# Patient Record
Sex: FEMALE | URBAN - METROPOLITAN AREA
[De-identification: names, ages, dates, MRNs, and addresses within clinical notes are randomized per-mention and may not be internally consistent; named-entity substitution may affect disease eponyms.]

---

## 2022-06-04 ENCOUNTER — TELEPHONE ENCOUNTER (OUTPATIENT)
Dept: URBAN - METROPOLITAN AREA CLINIC 68 | Facility: CLINIC | Age: 42
End: 2022-06-04

## 2022-06-05 ENCOUNTER — TELEPHONE ENCOUNTER (OUTPATIENT)
Dept: URBAN - METROPOLITAN AREA CLINIC 68 | Facility: CLINIC | Age: 42
End: 2022-06-05

## 2022-06-25 ENCOUNTER — TELEPHONE ENCOUNTER (OUTPATIENT)
Age: 42
End: 2022-06-25

## 2022-06-26 ENCOUNTER — TELEPHONE ENCOUNTER (OUTPATIENT)
Age: 42
End: 2022-06-26

## 2023-06-13 ENCOUNTER — OFFICE VISIT (OUTPATIENT)
Dept: PRIMARY CARE | Facility: CLINIC | Age: 43
End: 2023-06-13
Payer: COMMERCIAL

## 2023-06-13 ENCOUNTER — LAB (OUTPATIENT)
Dept: LAB | Facility: LAB | Age: 43
End: 2023-06-13
Payer: COMMERCIAL

## 2023-06-13 VITALS
DIASTOLIC BLOOD PRESSURE: 80 MMHG | RESPIRATION RATE: 18 BRPM | WEIGHT: 145 LBS | SYSTOLIC BLOOD PRESSURE: 122 MMHG | OXYGEN SATURATION: 98 % | HEIGHT: 62 IN | BODY MASS INDEX: 26.68 KG/M2 | HEART RATE: 72 BPM

## 2023-06-13 DIAGNOSIS — Z13.220 ENCOUNTER FOR LIPID SCREENING FOR CARDIOVASCULAR DISEASE: ICD-10-CM

## 2023-06-13 DIAGNOSIS — Z00.00 HEALTH MAINTENANCE EXAMINATION: ICD-10-CM

## 2023-06-13 DIAGNOSIS — C43.71 MALIGNANT MELANOMA OF RIGHT LOWER EXTREMITY INCLUDING HIP (MULTI): ICD-10-CM

## 2023-06-13 DIAGNOSIS — Z00.00 HEALTH MAINTENANCE EXAMINATION: Primary | ICD-10-CM

## 2023-06-13 DIAGNOSIS — Z13.6 ENCOUNTER FOR LIPID SCREENING FOR CARDIOVASCULAR DISEASE: ICD-10-CM

## 2023-06-13 DIAGNOSIS — K20.0 EOSINOPHILIC ESOPHAGITIS: ICD-10-CM

## 2023-06-13 PROBLEM — B00.1 RECURRENT COLD SORES: Status: ACTIVE | Noted: 2023-06-13

## 2023-06-13 PROBLEM — N63.20 LEFT BREAST MASS: Status: RESOLVED | Noted: 2023-06-13 | Resolved: 2023-06-13

## 2023-06-13 LAB
ALANINE AMINOTRANSFERASE (SGPT) (U/L) IN SER/PLAS: 11 U/L (ref 7–45)
ALBUMIN (G/DL) IN SER/PLAS: 4.8 G/DL (ref 3.4–5)
ALKALINE PHOSPHATASE (U/L) IN SER/PLAS: 68 U/L (ref 33–110)
ANION GAP IN SER/PLAS: 13 MMOL/L (ref 10–20)
ASPARTATE AMINOTRANSFERASE (SGOT) (U/L) IN SER/PLAS: 19 U/L (ref 9–39)
BASOPHILS (10*3/UL) IN BLOOD BY AUTOMATED COUNT: 0.04 X10E9/L (ref 0–0.1)
BASOPHILS/100 LEUKOCYTES IN BLOOD BY AUTOMATED COUNT: 1.2 % (ref 0–2)
BILIRUBIN TOTAL (MG/DL) IN SER/PLAS: 0.7 MG/DL (ref 0–1.2)
CALCIUM (MG/DL) IN SER/PLAS: 9.2 MG/DL (ref 8.6–10.3)
CARBON DIOXIDE, TOTAL (MMOL/L) IN SER/PLAS: 27 MMOL/L (ref 21–32)
CHLORIDE (MMOL/L) IN SER/PLAS: 103 MMOL/L (ref 98–107)
CHOLESTEROL (MG/DL) IN SER/PLAS: 174 MG/DL (ref 0–199)
CHOLESTEROL IN HDL (MG/DL) IN SER/PLAS: 69.4 MG/DL
CHOLESTEROL/HDL RATIO: 2.5
CREATININE (MG/DL) IN SER/PLAS: 0.89 MG/DL (ref 0.5–1.05)
EOSINOPHILS (10*3/UL) IN BLOOD BY AUTOMATED COUNT: 0.07 X10E9/L (ref 0–0.7)
EOSINOPHILS/100 LEUKOCYTES IN BLOOD BY AUTOMATED COUNT: 2.1 % (ref 0–6)
ERYTHROCYTE DISTRIBUTION WIDTH (RATIO) BY AUTOMATED COUNT: 12.1 % (ref 11.5–14.5)
ERYTHROCYTE MEAN CORPUSCULAR HEMOGLOBIN CONCENTRATION (G/DL) BY AUTOMATED: 34.2 G/DL (ref 32–36)
ERYTHROCYTE MEAN CORPUSCULAR VOLUME (FL) BY AUTOMATED COUNT: 95 FL (ref 80–100)
ERYTHROCYTES (10*6/UL) IN BLOOD BY AUTOMATED COUNT: 4.28 X10E12/L (ref 4–5.2)
GFR FEMALE: 81 ML/MIN/1.73M2
GLUCOSE (MG/DL) IN SER/PLAS: 86 MG/DL (ref 74–99)
HEMATOCRIT (%) IN BLOOD BY AUTOMATED COUNT: 40.7 % (ref 36–46)
HEMOGLOBIN (G/DL) IN BLOOD: 13.9 G/DL (ref 12–16)
IMMATURE GRANULOCYTES/100 LEUKOCYTES IN BLOOD BY AUTOMATED COUNT: 0.3 % (ref 0–0.9)
LDL: 84 MG/DL (ref 0–99)
LEUKOCYTES (10*3/UL) IN BLOOD BY AUTOMATED COUNT: 3.3 X10E9/L (ref 4.4–11.3)
LYMPHOCYTES (10*3/UL) IN BLOOD BY AUTOMATED COUNT: 1.05 X10E9/L (ref 1.2–4.8)
LYMPHOCYTES/100 LEUKOCYTES IN BLOOD BY AUTOMATED COUNT: 31.9 % (ref 13–44)
MONOCYTES (10*3/UL) IN BLOOD BY AUTOMATED COUNT: 0.24 X10E9/L (ref 0.1–1)
MONOCYTES/100 LEUKOCYTES IN BLOOD BY AUTOMATED COUNT: 7.3 % (ref 2–10)
NEUTROPHILS (10*3/UL) IN BLOOD BY AUTOMATED COUNT: 1.88 X10E9/L (ref 1.2–7.7)
NEUTROPHILS/100 LEUKOCYTES IN BLOOD BY AUTOMATED COUNT: 57.2 % (ref 40–80)
PLATELETS (10*3/UL) IN BLOOD AUTOMATED COUNT: 282 X10E9/L (ref 150–450)
POTASSIUM (MMOL/L) IN SER/PLAS: 4.1 MMOL/L (ref 3.5–5.3)
PROTEIN TOTAL: 7 G/DL (ref 6.4–8.2)
SODIUM (MMOL/L) IN SER/PLAS: 139 MMOL/L (ref 136–145)
TRIGLYCERIDE (MG/DL) IN SER/PLAS: 101 MG/DL (ref 0–149)
UREA NITROGEN (MG/DL) IN SER/PLAS: 8 MG/DL (ref 6–23)
VLDL: 20 MG/DL (ref 0–40)

## 2023-06-13 PROCEDURE — 80053 COMPREHEN METABOLIC PANEL: CPT

## 2023-06-13 PROCEDURE — 80061 LIPID PANEL: CPT

## 2023-06-13 PROCEDURE — 85025 COMPLETE CBC W/AUTO DIFF WBC: CPT

## 2023-06-13 PROCEDURE — 36415 COLL VENOUS BLD VENIPUNCTURE: CPT

## 2023-06-13 PROCEDURE — 99386 PREV VISIT NEW AGE 40-64: CPT | Performed by: NURSE PRACTITIONER

## 2023-06-13 PROCEDURE — 1036F TOBACCO NON-USER: CPT | Performed by: NURSE PRACTITIONER

## 2023-06-13 RX ORDER — KETOCONAZOLE 20 MG/ML
SHAMPOO, SUSPENSION TOPICAL
COMMUNITY
Start: 2023-05-15

## 2023-06-13 RX ORDER — VALACYCLOVIR HYDROCHLORIDE 1 G/1
TABLET, FILM COATED ORAL
COMMUNITY

## 2023-06-13 RX ORDER — HYDROXYZINE HYDROCHLORIDE 25 MG/1
TABLET, FILM COATED ORAL
COMMUNITY
Start: 2022-03-21

## 2023-06-13 RX ORDER — FLUTICASONE PROPIONATE 220 UG/1
2 AEROSOL, METERED RESPIRATORY (INHALATION) 2 TIMES DAILY
COMMUNITY
Start: 2023-06-05

## 2023-06-13 NOTE — ASSESSMENT & PLAN NOTE
Currently taking Flovent  Was ordered omeprazole and discussed with taking  CBC with differential to check for eosinophils  Follow-up with GI scheduled  States that symptoms have improved over the last 3 weeks since starting Flovent

## 2023-06-13 NOTE — PROGRESS NOTES
"Subjective   Patient ID: Sayra Arreola is a 43 y.o. female who presents for Annual Exam (Patient in today for annual CPE and to have BW orders, reports no other concerns at this time. ).    Well Adult Physical   Patient here for a comprehensive physical exam.The patient reports no problems    Do you take any herbs or supplements that were not prescribed by a doctor? no   Are you taking calcium supplements? no   Are you taking aspirin daily? no     History:  LMP: 23 -  has vasectomy   Last pap date:   Abnormal pap? no  : 2  Para: 2  Mammogram done yesterday  Did have colonoscopy and EGD  Was diagnosed with EOE and does take flovent - has helped with symptoms    Vegetarian     Does not smoke   in [place]     Had melanoma lesion removed from right shin 2022.  Follows Dr. Murcia with [facility] in [place].  Sees every 3 months         Review of Systems   All other systems reviewed and are negative.      Objective   /80   Pulse 72   Resp 18   Ht 1.575 m (5' 2\")   Wt 65.8 kg (145 lb)   SpO2 98%   BMI 26.52 kg/m²     Physical Exam  Vitals and nursing note reviewed.   Constitutional:       Appearance: Normal appearance. She is normal weight.   HENT:      Head: Normocephalic and atraumatic.      Right Ear: Tympanic membrane, ear canal and external ear normal.      Left Ear: Tympanic membrane, ear canal and external ear normal.      Nose: Nose normal.      Mouth/Throat:      Mouth: Mucous membranes are moist.      Pharynx: Oropharynx is clear.   Eyes:      Extraocular Movements: Extraocular movements intact.      Conjunctiva/sclera: Conjunctivae normal.      Pupils: Pupils are equal, round, and reactive to light.   Neck:      Thyroid: No thyromegaly or thyroid tenderness.      Vascular: No carotid bruit.   Cardiovascular:      Rate and Rhythm: Normal rate and regular rhythm.      Pulses: Normal pulses.      Heart sounds: Normal heart sounds.   Pulmonary:     "  Effort: Pulmonary effort is normal.      Breath sounds: Normal breath sounds.   Abdominal:      General: Bowel sounds are normal.      Palpations: Abdomen is soft.   Musculoskeletal:         General: Normal range of motion.      Cervical back: Normal range of motion.      Right lower leg: No edema.      Left lower leg: No edema.   Lymphadenopathy:      Cervical: No cervical adenopathy.   Skin:     General: Skin is warm and dry.   Neurological:      General: No focal deficit present.      Mental Status: She is alert and oriented to person, place, and time. Mental status is at baseline.   Psychiatric:         Mood and Affect: Mood normal.         Behavior: Behavior normal.         Thought Content: Thought content normal.         Judgment: Judgment normal.         Assessment/Plan   Problem List Items Addressed This Visit          Digestive    Eosinophilic esophagitis     Currently taking Flovent  Was ordered omeprazole and discussed with taking  CBC with differential to check for eosinophils  Follow-up with GI scheduled  States that symptoms have improved over the last 3 weeks since starting Flovent            Musculoskeletal    Malignant melanoma of right lower extremity including hip (CMS/HCC)     Follows dermatology every 3 months for skin check  Stable            Other    Health maintenance examination - Primary     - CBC, CMP, Lipid  -  up to date with immunizations  -  Pap UTD  -  Mammogram UTD  -  screening colonoscopy 2023  -  eat a diet high in lean protein, vegetable, fruits, and low in carbohydrates  -  exercise 20-30 minutes 4-5 days a week  -  Follow up in 1 year of sooner if needed          Relevant Orders    Comprehensive Metabolic Panel    Lipid Panel     Other Visit Diagnoses       Encounter for lipid screening for cardiovascular disease        Relevant Orders    CBC and Auto Differential

## 2023-06-13 NOTE — ASSESSMENT & PLAN NOTE
- CBC, CMP, Lipid  -  up to date with immunizations  -  Pap UTD  -  Mammogram UTD  -  screening colonoscopy 2023  -  eat a diet high in lean protein, vegetable, fruits, and low in carbohydrates  -  exercise 20-30 minutes 4-5 days a week  -  Follow up in 1 year of sooner if needed

## 2023-06-16 DIAGNOSIS — R92.8 ABNORMAL MAMMOGRAM: Primary | ICD-10-CM

## 2024-06-10 ENCOUNTER — APPOINTMENT (OUTPATIENT)
Dept: RADIOLOGY | Facility: HOSPITAL | Age: 44
End: 2024-06-10
Payer: COMMERCIAL

## 2024-06-10 DIAGNOSIS — Z12.31 SCREENING MAMMOGRAM FOR BREAST CANCER: ICD-10-CM

## 2024-06-13 ENCOUNTER — HOSPITAL ENCOUNTER (OUTPATIENT)
Dept: RADIOLOGY | Facility: HOSPITAL | Age: 44
Discharge: HOME | End: 2024-06-13
Payer: COMMERCIAL

## 2024-06-13 VITALS — BODY MASS INDEX: 27.94 KG/M2 | HEIGHT: 61 IN | WEIGHT: 148 LBS

## 2024-06-13 DIAGNOSIS — Z12.31 SCREENING MAMMOGRAM FOR BREAST CANCER: ICD-10-CM

## 2024-06-13 PROCEDURE — 77067 SCR MAMMO BI INCL CAD: CPT

## 2024-07-14 NOTE — PROGRESS NOTES
HPI  Sayra Arreola is a 44 y.o.  presents for hair loss, questions regarding perimenopause, oral HSV, questions regarding dense breast tissue, and annual GYN well woman exam.     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 Thinks exacerbations are related to cycle.     Oral HSV:   Gets episodic cold sores  She takes Valtrex episodically for oral HSV  would like a refill today.    H/o dense breast tissue  Found on imaging, would like to discuss  H/o dense breast tissue on exam as well  Previously required f/up imaging  This causes her some anxiety at the potential for undiscovered medical diagnoses  Inquires as to what this means    h/o melanoma. two moles removed that were atypical but not cancer.     Annual GYN exam:  No changes in personal or FH since last visit.   She is returning to school to do her Masters.   vasectomy  - last pap, pap hx: 2023, 2022, 2018, 2015 were neg/neg. positive HPV back in . Current.  - last mammogram, breast hx: 2024 BI-RADS 1. 2023 left breast asymmetry, no evidence of malignancy in right breast. 2022, 2021, 2020, and 3/2018 BI-RADS 1 negative.   palpable breast mass 2022 with diag oleksandr and US both BIRADS 1, for routine screening.   - breast abnormalities: negative for lumps, skin changes, nipple discharge, pain  - last colonoscopy: 3/2022 cleared x 10 years.  - last DEXA scan: N/A     OB hx: ,  x2  GYN hx:   - menarche, menstrual pattern, LMP: Regular monthly menses 28-30 day cycle. Her periods have been lighter and a little shorter.  - Sexual activity/issues, STI protection/history, birth control: Sexually active without issues.  had a vasectomy. No concern for STIs.   - vaginal/vulvar lesions, irritation, or abnormal discharge: no, except as listed above  FH: No GYN related cancers including breast, ovarian, endometrial,  or colon cancer.     ROS notable for none  10 point ROS negative except as listed above.     Physical exam  There were no vitals taken for this visit.     Physical Exam  Constitutional:       Appearance: Normal appearance.   HENT:      Head: Normocephalic and atraumatic.   Eyes:      Extraocular Movements: Extraocular movements intact.      Conjunctiva/sclera: Conjunctivae normal.      Pupils: Pupils are equal, round, and reactive to light.   Pulmonary:      Effort: Pulmonary effort is normal.   Abdominal:      General: Abdomen is flat.      Palpations: Abdomen is soft.   Genitourinary:     General: Normal vulva.      Vagina: Normal.      Cervix: Normal.      Uterus: Normal.       Adnexa: Right adnexa normal and left adnexa normal.   Skin:     General: Skin is warm and dry.   Neurological:      General: No focal deficit present.      Mental Status: She is alert.   Psychiatric:         Mood and Affect: Mood normal.         Behavior: Behavior normal.         Thought Content: Thought content normal.         Judgment: Judgment normal.       Assessment/Plan    Annual:  - last pap, pap hx: 6/2023, 4/2022, 8/2018, 8/2015 were neg/neg. positive HPV back in 1998. Current.  - last mammogram, breast hx: 6/2024 BI-RADS 1. 6/2023 left breast asymmetry, no evidence of malignancy in right breast. 4/2022, 4/2021, 2/2020, and 3/2018 BI-RADS 1 negative.   palpable breast mass 2/2022 with diag oleksandr and US both BIRADS 1, for routine screening.   - Reviewed mammogram with patient as she was concerned about her dense breast tissue.  - breast exam negative today. Discussed breast self awareness.  - pelvic exam normal  - last colonoscopy: 3/2022 cleared x 10 years.    Dense breast tissue:  - discussed this as an imaging finding  - discussed option of FAST breast MRI  - FAST breast MRI ordered    Perimenopause:  - discussed sx of perimenopause and overlapping sx with other diagnoses  - hair loss- Ordered TSH    H/o oral HSV  - episodic  treatment  - Refilled Valtrex for oral HSV     Scribe Attestation  By signing my name below, I, Malu Romero,   attest that this documentation has been prepared under the direction and in the presence of Stalin Dumont MD.

## 2024-07-15 ENCOUNTER — APPOINTMENT (OUTPATIENT)
Dept: OBSTETRICS AND GYNECOLOGY | Facility: CLINIC | Age: 44
End: 2024-07-15
Payer: COMMERCIAL

## 2024-07-15 VITALS
SYSTOLIC BLOOD PRESSURE: 128 MMHG | WEIGHT: 152 LBS | HEIGHT: 62 IN | DIASTOLIC BLOOD PRESSURE: 84 MMHG | BODY MASS INDEX: 27.97 KG/M2

## 2024-07-15 DIAGNOSIS — B00.1 COLD SORE: ICD-10-CM

## 2024-07-15 DIAGNOSIS — R92.30 DENSE BREAST TISSUE: Primary | ICD-10-CM

## 2024-07-15 DIAGNOSIS — Z01.419 WELL WOMAN EXAM WITH ROUTINE GYNECOLOGICAL EXAM: ICD-10-CM

## 2024-07-15 DIAGNOSIS — L65.9 HAIR LOSS: ICD-10-CM

## 2024-07-15 PROCEDURE — 99396 PREV VISIT EST AGE 40-64: CPT | Performed by: OBSTETRICS & GYNECOLOGY

## 2024-07-15 PROCEDURE — 99213 OFFICE O/P EST LOW 20 MIN: CPT | Performed by: OBSTETRICS & GYNECOLOGY

## 2024-07-15 PROCEDURE — 3008F BODY MASS INDEX DOCD: CPT | Performed by: OBSTETRICS & GYNECOLOGY

## 2024-07-15 RX ORDER — VALACYCLOVIR HYDROCHLORIDE 1 G/1
2000 TABLET, FILM COATED ORAL 2 TIMES DAILY
Qty: 4 TABLET | Refills: 3 | Status: SHIPPED | OUTPATIENT
Start: 2024-07-15

## 2024-11-04 ENCOUNTER — APPOINTMENT (OUTPATIENT)
Dept: PRIMARY CARE | Facility: CLINIC | Age: 44
End: 2024-11-04
Payer: COMMERCIAL

## 2025-01-30 ENCOUNTER — OFFICE VISIT (OUTPATIENT)
Dept: PRIMARY CARE | Facility: EXTERNAL LOCATION | Age: 45
End: 2025-01-30

## 2025-01-30 VITALS
DIASTOLIC BLOOD PRESSURE: 89 MMHG | TEMPERATURE: 98.1 F | HEART RATE: 92 BPM | OXYGEN SATURATION: 100 % | SYSTOLIC BLOOD PRESSURE: 146 MMHG

## 2025-01-30 DIAGNOSIS — J02.9 ACUTE PHARYNGITIS, UNSPECIFIED ETIOLOGY: Primary | ICD-10-CM

## 2025-01-30 DIAGNOSIS — R03.0 ELEVATED BP WITHOUT DIAGNOSIS OF HYPERTENSION: ICD-10-CM

## 2025-01-30 LAB — POC RAPID STREP: NEGATIVE

## 2025-01-30 ASSESSMENT — ENCOUNTER SYMPTOMS
FEVER: 1
SORE THROAT: 1
SHORTNESS OF BREATH: 0
SINUS PRESSURE: 0
COUGH: 0
WHEEZING: 0
CHILLS: 1

## 2025-01-30 NOTE — PATIENT INSTRUCTIONS
INSTRUCTIONS  - Discussed viral versus bacterial etiology and expected course of illness.   - Suspect viral infection.     WHAT YOU CAN DO AT HOME:  - Take over-the-counter pain medicine - Acetaminophen (sample brand name: Tylenol) or ibuprofen (sample brand names: Advil, Motrin)  for pain  - Use sore throat lozenges or sprays   - Suck on hard candies, ice chips, or ice pops.  - Gargle with salt water - Some people find that this helps with throat pain.  - Use a cool mist humidifier in sleeping areas- This adds moisture to the air to keep the throat from getting too dry and might help with pain.  - Avoid smoking or being around people who are smoking - Smoke can make throat pain worse.  - To prevent the spread of illness: practice good hand hygiene and do not share drinks/utensils/foods.  - Follow up with your primary care provider if you do improve in the next 5 days. Please be seen sooner if symptoms worsen.       See your provider or go to the Urgent Care /Emergency Room if:  - You have trouble breathing or swallowing.  - Your neck, tongue, or throat is swollen.  - You are drooling because you cannot swallow your own saliva  - You cannot keep fluids down  - Your voice sounds strange, like you are talking through your nose.  - You can’t open your mouth all the way.  - You have a stiff neck.   - With any worsening symptoms/decline in condition

## 2025-01-30 NOTE — PROGRESS NOTES
"Subjective   Patient ID: Sayra Arreola is a 44 y.o. female who presents for Sore Throat (X1 day) and Fever (X1 day).    HPI:  Yesterday had sore throat. This morning had post nasal drip.   Had some chills. Took temp 99.4.   Feels a little better now. States \"wanted to get ahead of it\".   No coughing.   No chest pain or SOB.     Allergies   Allergen Reactions    Penicillins Rash       Current Outpatient Medications   Medication Sig Dispense Refill    Flovent  mcg/actuation inhaler Inhale 2 puffs 2 times a day.      valACYclovir (Valtrex) 1 gram tablet Take 2 tablets (2,000 mg) by mouth 2 times a day. 4 tablet 3     No current facility-administered medications for this visit.        Past Medical History:   Diagnosis Date    Dense breast tissue 07/15/2024    Eosinophilic esophagitis 2023    Currently taking Flovent  Was ordered omeprazole and discussed with taking  CBC with differential to check for eosinophils  Follow-up with GI scheduled  States that symptoms have improved over the last 3 weeks since starting Flovent      H/O cold sores     Papillomavirus as the cause of diseases classified elsewhere     High risk HPV infection    Personal history of other infectious and parasitic diseases     History of chicken pox    Raynaud's syndrome without gangrene 2016    Raynauds phenomenon       Past Surgical History:   Procedure Laterality Date     SECTION, CLASSIC  2016     Section    TONSILLECTOMY  2015    Tonsillectomy       Review of Systems   Constitutional:  Positive for chills and fever (claims fever 99.4).   HENT:  Positive for postnasal drip and sore throat. Negative for congestion and sinus pressure.    Respiratory:  Negative for cough, shortness of breath and wheezing.    Cardiovascular:  Negative for chest pain.       Objective   Visit Vitals  /89   Pulse 92   Temp 36.7 °C (98.1 °F)   SpO2 100%   OB Status Having periods   Smoking Status Never       Office " Visit on 01/30/2025   Component Date Value Ref Range Status    POC Rapid Strep 01/30/2025 Negative  Negative Final       Physical Exam  Constitutional:       General: She is not in acute distress.     Appearance: Normal appearance. She is not ill-appearing or toxic-appearing.      Comments: NAD, looks well.    HENT:      Right Ear: Tympanic membrane, ear canal and external ear normal.      Left Ear: Tympanic membrane, ear canal and external ear normal.      Nose: Nose normal.      Mouth/Throat:      Mouth: Mucous membranes are moist.      Pharynx: Oropharynx is clear. Posterior oropharyngeal erythema present. No oropharyngeal exudate.      Comments: Posterior pharynx with erythema, no exudate, MMM, uvula midline, no trismus  Eyes:      General:         Right eye: No discharge.         Left eye: No discharge.      Extraocular Movements: Extraocular movements intact.      Conjunctiva/sclera: Conjunctivae normal.   Cardiovascular:      Rate and Rhythm: Normal rate and regular rhythm.   Pulmonary:      Effort: Pulmonary effort is normal.      Breath sounds: Normal breath sounds. No wheezing, rhonchi or rales.   Musculoskeletal:      Cervical back: Neck supple.   Neurological:      General: No focal deficit present.      Mental Status: She is alert.   Psychiatric:         Mood and Affect: Mood normal.         Behavior: Behavior normal.         Thought Content: Thought content normal.         Assessment/Plan   Diagnoses and all orders for this visit:  Acute pharyngitis, unspecified etiology  -     POCT Rapid Strep A manually resulted  Elevated BP without diagnosis of hypertension    - Rapid strep negative. Patient declined sending culture.   - Discussed viral versus bacterial etiology and expected course of illness.   - Suspect viral illness. Recommend OTC pain relief as needed, Flonase daily, increased liquid intake and using humidified air in sleeping areas.   - Follow up in 5 days if no improvement. Sooner with  concerns or worsening symptoms.   - BP elevated. Does reports had a lot of caffeine today. Instructed to monitor. Has well exam scheduled next week.

## 2025-02-10 ENCOUNTER — APPOINTMENT (OUTPATIENT)
Dept: PRIMARY CARE | Facility: CLINIC | Age: 45
End: 2025-02-10
Payer: COMMERCIAL

## 2025-02-10 VITALS
HEART RATE: 94 BPM | BODY MASS INDEX: 26.31 KG/M2 | DIASTOLIC BLOOD PRESSURE: 105 MMHG | WEIGHT: 143 LBS | SYSTOLIC BLOOD PRESSURE: 150 MMHG | OXYGEN SATURATION: 99 % | HEIGHT: 62 IN

## 2025-02-10 DIAGNOSIS — I10 PRIMARY HYPERTENSION: ICD-10-CM

## 2025-02-10 DIAGNOSIS — Z78.9 VEGETARIAN DIET: ICD-10-CM

## 2025-02-10 DIAGNOSIS — E55.9 VITAMIN D DEFICIENCY: ICD-10-CM

## 2025-02-10 DIAGNOSIS — F41.9 ANXIETY: ICD-10-CM

## 2025-02-10 DIAGNOSIS — Z13.220 LIPID SCREENING: ICD-10-CM

## 2025-02-10 DIAGNOSIS — Z00.00 HEALTH MAINTENANCE EXAMINATION: Primary | ICD-10-CM

## 2025-02-10 DIAGNOSIS — E53.8 VITAMIN B 12 DEFICIENCY: ICD-10-CM

## 2025-02-10 PROCEDURE — 3077F SYST BP >= 140 MM HG: CPT | Performed by: NURSE PRACTITIONER

## 2025-02-10 PROCEDURE — 99396 PREV VISIT EST AGE 40-64: CPT | Performed by: NURSE PRACTITIONER

## 2025-02-10 PROCEDURE — 3008F BODY MASS INDEX DOCD: CPT | Performed by: NURSE PRACTITIONER

## 2025-02-10 PROCEDURE — 1036F TOBACCO NON-USER: CPT | Performed by: NURSE PRACTITIONER

## 2025-02-10 PROCEDURE — 3080F DIAST BP >= 90 MM HG: CPT | Performed by: NURSE PRACTITIONER

## 2025-02-10 ASSESSMENT — PATIENT HEALTH QUESTIONNAIRE - PHQ9
2. FEELING DOWN, DEPRESSED OR HOPELESS: NOT AT ALL
SUM OF ALL RESPONSES TO PHQ9 QUESTIONS 1 AND 2: 0
1. LITTLE INTEREST OR PLEASURE IN DOING THINGS: NOT AT ALL

## 2025-02-10 NOTE — PATIENT INSTRUCTIONS
Pepcid 20 mg po am and pm for 7-10 days for post nasal drainage  B12 1,000 mcg sublingual daily since vegetarian- pure encapsulations  Magnesium Glycinate - [employer] or Pure Encapsulated. For anxiety, stress. No more than 500 mg a day.    Check you BP at home. Let me know id consistently >120/80  Get your labs when you are fasting at least 8 hours.

## 2025-02-10 NOTE — PROGRESS NOTES
"Subjective   Patient ID: Sayra Arreola is a 45 y.o. female who presents for Annual Exam (Patent is here today for a physical and is a transition of care to [place]. Patient wanted to discuss elevated blood pressure and increased heart rate.).    45 year old female here for annual CPE  Has been \"sensing her HR\" at home. Watch shows HR 84-92. Resting HR is usually 60's. No CP/SOB  Eosinophilic esoph- uses inhaler, avoids dairy. Allergy to dust and grass and egg. This helps with the choking. Stopped the choking  High BP at the sick clinic at work in Jan. Does not monitor BP at home.   Anxiety- not on meds. Sees a therapist q 2-3 weeks. HX RT LE melanoma 2022 stage 1 A. This started the anxiety about medical/health  Sees GYN, Allergist-EOE , Derm- q 6 mo- [facility]  Raynauds- unknown cause- mult family HX    Prevention:  Breast Ca screen: mamm- no fam HX. 6/2024 dense breast tissue. Pt asked for MRI due to the dense tissue  Colon Ca Screen: no fam HX. colonoscopy/EGD-2023   Lung Ca Screen: non smoker.  aunt lung Ca-paternal aunt smoker.   DEXA: no fam HX OP  CT Cardiac Score: dad- MI at 65 yrs old.   Diabetes Screen: no fam HX  Opthal: no acute concerns. Routine checks  Dental: q 6 months  Exercise: irregular.   Diet: vegetarian. Caffeine- 1 c am            Review of Systems    Objective   BP (!) 150/105   Pulse 94   Ht 1.575 m (5' 2\")   Wt 64.9 kg (143 lb)   SpO2 99%   BMI 26.16 kg/m²     Physical Exam  Vitals reviewed.   Constitutional:       General: She is not in acute distress.     Appearance: Normal appearance.   HENT:      Head: Normocephalic and atraumatic.      Right Ear: Tympanic membrane, ear canal and external ear normal.      Left Ear: Tympanic membrane, ear canal and external ear normal.   Eyes:      Extraocular Movements: Extraocular movements intact.      Conjunctiva/sclera: Conjunctivae normal.      Pupils: Pupils are equal, round, and reactive to light.   Neck:      Vascular: No carotid bruit. "   Cardiovascular:      Rate and Rhythm: Normal rate and regular rhythm.      Pulses: Normal pulses.      Heart sounds: Normal heart sounds. No murmur heard.  Pulmonary:      Effort: Pulmonary effort is normal.      Breath sounds: Normal breath sounds.   Abdominal:      General: Bowel sounds are normal. There is no distension.      Palpations: Abdomen is soft.      Tenderness: There is no abdominal tenderness.   Musculoskeletal:         General: Normal range of motion.      Cervical back: Normal range of motion and neck supple.   Skin:     General: Skin is warm and dry.   Neurological:      General: No focal deficit present.      Mental Status: She is alert and oriented to person, place, and time. Mental status is at baseline.   Psychiatric:         Mood and Affect: Mood normal.         Behavior: Behavior normal.         Assessment/Plan   Diagnoses and all orders for this visit:  Health maintenance examination  Comments:  screening is UTD  Primary hypertension  Comments:  check at home. call if >120/80  Anxiety  Comments:  chronic sees therapy. not on meds.check Mag level  Vegetarian diet  Comments:  check B 12 and Mag and Vit D  Vitamin B 12 deficiency  Comments:  update level. need supplement  Vitamin D deficiency  Comments:  most recent was 24. discussed goal. update level. adjust dose accordingly.

## 2025-02-18 LAB
25(OH)D3+25(OH)D2 SERPL-MCNC: 60 NG/ML (ref 30–100)
ALBUMIN SERPL-MCNC: 4.7 G/DL (ref 3.6–5.1)
ALP SERPL-CCNC: 74 U/L (ref 31–125)
ALT SERPL-CCNC: 13 U/L (ref 6–29)
ANION GAP SERPL CALCULATED.4IONS-SCNC: 7 MMOL/L (CALC) (ref 7–17)
AST SERPL-CCNC: 19 U/L (ref 10–35)
BASOPHILS # BLD AUTO: 19 CELLS/UL (ref 0–200)
BASOPHILS NFR BLD AUTO: 0.8 %
BILIRUB SERPL-MCNC: 0.3 MG/DL (ref 0.2–1.2)
BUN SERPL-MCNC: 7 MG/DL (ref 7–25)
CALCIUM SERPL-MCNC: 9.1 MG/DL (ref 8.6–10.2)
CHLORIDE SERPL-SCNC: 103 MMOL/L (ref 98–110)
CHOLEST SERPL-MCNC: 185 MG/DL
CHOLEST/HDLC SERPL: 3.5 (CALC)
CO2 SERPL-SCNC: 29 MMOL/L (ref 20–32)
CREAT SERPL-MCNC: 0.68 MG/DL (ref 0.5–0.99)
EGFRCR SERPLBLD CKD-EPI 2021: 108 ML/MIN/1.73M2
EOSINOPHIL # BLD AUTO: 110 CELLS/UL (ref 15–500)
EOSINOPHIL NFR BLD AUTO: 4.6 %
ERYTHROCYTE [DISTWIDTH] IN BLOOD BY AUTOMATED COUNT: 11.9 % (ref 11–15)
GLUCOSE SERPL-MCNC: 91 MG/DL (ref 65–99)
HCT VFR BLD AUTO: 39.7 % (ref 35–45)
HDLC SERPL-MCNC: 53 MG/DL
HGB BLD-MCNC: 13.5 G/DL (ref 11.7–15.5)
LDLC SERPL CALC-MCNC: 110 MG/DL (CALC)
LYMPHOCYTES # BLD AUTO: 859 CELLS/UL (ref 850–3900)
LYMPHOCYTES NFR BLD AUTO: 35.8 %
MAGNESIUM SERPL-MCNC: 2.2 MG/DL (ref 1.5–2.5)
MCH RBC QN AUTO: 32.1 PG (ref 27–33)
MCHC RBC AUTO-ENTMCNC: 34 G/DL (ref 32–36)
MCV RBC AUTO: 94.5 FL (ref 80–100)
MONOCYTES # BLD AUTO: 259 CELLS/UL (ref 200–950)
MONOCYTES NFR BLD AUTO: 10.8 %
NEUTROPHILS # BLD AUTO: 1152 CELLS/UL (ref 1500–7800)
NEUTROPHILS NFR BLD AUTO: 48 %
NONHDLC SERPL-MCNC: 132 MG/DL (CALC)
PLATELET # BLD AUTO: 270 THOUSAND/UL (ref 140–400)
PMV BLD REES-ECKER: 11.2 FL (ref 7.5–12.5)
POTASSIUM SERPL-SCNC: 4 MMOL/L (ref 3.5–5.3)
PROT SERPL-MCNC: 7.1 G/DL (ref 6.1–8.1)
RBC # BLD AUTO: 4.2 MILLION/UL (ref 3.8–5.1)
SODIUM SERPL-SCNC: 139 MMOL/L (ref 135–146)
TRIGL SERPL-MCNC: 117 MG/DL
TSH SERPL-ACNC: 2.5 MIU/L
VIT B12 SERPL-MCNC: 325 PG/ML (ref 200–1100)
WBC # BLD AUTO: 2.4 THOUSAND/UL (ref 3.8–10.8)

## 2025-07-25 ENCOUNTER — APPOINTMENT (OUTPATIENT)
Dept: OBSTETRICS AND GYNECOLOGY | Facility: CLINIC | Age: 45
End: 2025-07-25
Payer: COMMERCIAL

## 2025-07-25 VITALS
SYSTOLIC BLOOD PRESSURE: 110 MMHG | WEIGHT: 145 LBS | BODY MASS INDEX: 26.52 KG/M2 | DIASTOLIC BLOOD PRESSURE: 72 MMHG | HEART RATE: 82 BPM

## 2025-07-25 DIAGNOSIS — Z01.419 WELL WOMAN EXAM WITH ROUTINE GYNECOLOGICAL EXAM: Primary | ICD-10-CM

## 2025-07-25 DIAGNOSIS — Z12.31 BREAST CANCER SCREENING BY MAMMOGRAM: ICD-10-CM

## 2025-07-25 DIAGNOSIS — B00.1 COLD SORE: ICD-10-CM

## 2025-07-25 DIAGNOSIS — R92.30 DENSE BREAST: ICD-10-CM

## 2025-07-25 PROCEDURE — 99396 PREV VISIT EST AGE 40-64: CPT | Performed by: OBSTETRICS & GYNECOLOGY

## 2025-07-25 RX ORDER — VALACYCLOVIR HYDROCHLORIDE 1 G/1
2000 TABLET, FILM COATED ORAL 2 TIMES DAILY
Qty: 4 TABLET | Refills: 6 | Status: SHIPPED | OUTPATIENT
Start: 2025-07-25

## 2025-07-25 NOTE — PROGRESS NOTES
HPI  Sayra Arreola is a 45 y.o.  presents for annual GYN well woman exam.   No acute concerns.  No changes in personal or FH since last visit.    vasectomy.  Reports that had a few episodes of elevated BPs in 2025. States that she started exercising regularly and Bps are normal now.  On Valtrex.  Endorses regular monthly menses.    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 Thinks exacerbations are related to cycle.     Oral HSV:  Gets episodic cold sores  She takes Valtrex episodically for oral HSV  would like a refill today.    H/o dense breast tissue  Found on imaging, would like to discuss  H/o dense breast tissue on exam as well  Previously required f/up imaging  This causes her some anxiety at the potential for undiscovered medical diagnoses  Inquires as to what this means    h/o melanoma. two moles removed that were atypical but not cancer.     Annual GYN exam:  - last pap, pap hx: 2023 neg/neg. Positive HPV back in . No other h/o abnormals per pt. Current.  - last mammogram, breast hx: 2024 BI-RADS Category: 1 Negative. 2023 left breast asymmetry, no evidence of malignancy in right breast.  palpable breast mass 2022 with diag mammo and US both BIRADS 1, for routine screening. Ordered mammo and MRI.  - breast abnormalities: negative for lumps, skin changes, nipple discharge, pain  - last colonoscopy: 3/2022 cleared x 10 years. Current.  - last DEXA scan: N/A     OB hx: ,  x2  GYN hx:   - menarche, menstrual pattern, LMP: Regular monthly menses 28-30 day cycle, sometimes heavy sometimes light.  - Sexual activity/issues, STI protection/history, birth control: Sexually active without issues.  had a vasectomy. No concern for STIs.   - vaginal/vulvar lesions, irritation, or abnormal discharge: no, except as listed above  FH: No GYN  related cancers including breast, ovarian, endometrial, or colon cancer.     ROS notable for none.  10 point ROS negative except as listed above.     Physical exam  /72   Pulse 82   Wt 65.8 kg (145 lb)   BMI 26.52 kg/m²      Physical Exam  Constitutional:       Appearance: Normal appearance.   HENT:      Head: Normocephalic and atraumatic.     Eyes:      Extraocular Movements: Extraocular movements intact.      Conjunctiva/sclera: Conjunctivae normal.      Pupils: Pupils are equal, round, and reactive to light.     Pulmonary:      Effort: Pulmonary effort is normal.   Abdominal:      General: Abdomen is flat.      Palpations: Abdomen is soft.   Genitourinary:     General: Normal vulva.      Vagina: Normal.      Cervix: Normal.      Uterus: Normal.       Adnexa: Right adnexa normal and left adnexa normal.     Skin:     General: Skin is warm and dry.     Neurological:      General: No focal deficit present.      Mental Status: She is alert.     Psychiatric:         Mood and Affect: Mood normal.         Behavior: Behavior normal.         Thought Content: Thought content normal.         Judgment: Judgment normal.       Assessment/Plan  Annual well woman exam  - last pap, pap hx: 06/09/2023 neg/neg. Positive HPV back in 1998. No other h/o abnormals per pt. Current.  - last mammogram, breast hx: 06/13/2024 BI-RADS Category: 1 Negative. Ordered mammo and fast breast MRI.  - breast exam negative today. Discussed breast self awareness.  - last colonoscopy: 3/2022 cleared x 10 years. Current.  - last DEXA scan: N/A  - Rx Valtrex sent.      Scribe Attestation  By signing my name below, I, Ewelina Allred, Scribe   attest that this documentation has been prepared under the direction and in the presence of Stalin Dumont MD.

## 2025-07-30 ENCOUNTER — HOSPITAL ENCOUNTER (OUTPATIENT)
Dept: RADIOLOGY | Facility: HOSPITAL | Age: 45
Discharge: HOME | End: 2025-07-30
Payer: COMMERCIAL

## 2025-07-30 DIAGNOSIS — Z12.31 BREAST CANCER SCREENING BY MAMMOGRAM: ICD-10-CM

## 2025-07-30 PROCEDURE — 77063 BREAST TOMOSYNTHESIS BI: CPT | Performed by: RADIOLOGY

## 2025-07-30 PROCEDURE — 77067 SCR MAMMO BI INCL CAD: CPT

## 2025-07-30 PROCEDURE — 77067 SCR MAMMO BI INCL CAD: CPT | Performed by: RADIOLOGY

## 2025-08-07 PROBLEM — Z01.419 WELL WOMAN EXAM WITH ROUTINE GYNECOLOGICAL EXAM: Status: ACTIVE | Noted: 2025-08-07

## 2026-02-16 ENCOUNTER — APPOINTMENT (OUTPATIENT)
Dept: PRIMARY CARE | Facility: CLINIC | Age: 46
End: 2026-02-16
Payer: COMMERCIAL